# Patient Record
Sex: FEMALE | Race: WHITE | Employment: FULL TIME | URBAN - NONMETROPOLITAN AREA
[De-identification: names, ages, dates, MRNs, and addresses within clinical notes are randomized per-mention and may not be internally consistent; named-entity substitution may affect disease eponyms.]

---

## 2019-01-11 ENCOUNTER — OFFICE VISIT (OUTPATIENT)
Dept: FAMILY MEDICINE CLINIC | Age: 19
End: 2019-01-11
Payer: COMMERCIAL

## 2019-01-11 ENCOUNTER — HOSPITAL ENCOUNTER (OUTPATIENT)
Dept: LAB | Age: 19
Discharge: HOME | End: 2019-01-11
Payer: COMMERCIAL

## 2019-01-11 VITALS
SYSTOLIC BLOOD PRESSURE: 120 MMHG | WEIGHT: 236.4 LBS | BODY MASS INDEX: 40.36 KG/M2 | HEART RATE: 68 BPM | DIASTOLIC BLOOD PRESSURE: 82 MMHG | HEIGHT: 64 IN | OXYGEN SATURATION: 100 %

## 2019-01-11 DIAGNOSIS — Z76.89 ENCOUNTER TO ESTABLISH CARE: ICD-10-CM

## 2019-01-11 DIAGNOSIS — Z00.00 ROUTINE HEALTH MAINTENANCE: ICD-10-CM

## 2019-01-11 DIAGNOSIS — Z00.00 ROUTINE HEALTH MAINTENANCE: Primary | ICD-10-CM

## 2019-01-11 DIAGNOSIS — E87.6 LOW BLOOD POTASSIUM: ICD-10-CM

## 2019-01-11 LAB
ABSOLUTE EOS #: 0 K/UL (ref 0–0.4)
ABSOLUTE IMMATURE GRANULOCYTE: ABNORMAL K/UL (ref 0–0.3)
ABSOLUTE LYMPH #: 2.2 K/UL (ref 1.2–5.2)
ABSOLUTE MONO #: 0.9 K/UL (ref 0.1–1.3)
ALBUMIN SERPL-MCNC: 3.9 G/DL (ref 3.5–5.2)
ALBUMIN/GLOBULIN RATIO: 0.9 (ref 1–2.5)
ALP BLD-CCNC: 81 U/L (ref 35–104)
ALT SERPL-CCNC: 13 U/L (ref 5–33)
ANION GAP SERPL CALCULATED.3IONS-SCNC: 11 MMOL/L (ref 9–17)
AST SERPL-CCNC: 14 U/L
BASOPHILS # BLD: 0 % (ref 0–1)
BASOPHILS ABSOLUTE: 0 K/UL (ref 0–0.2)
BILIRUB SERPL-MCNC: 0.17 MG/DL (ref 0.3–1.2)
BUN BLDV-MCNC: 15 MG/DL (ref 6–20)
BUN/CREAT BLD: 26 (ref 9–20)
CALCIUM SERPL-MCNC: 9.4 MG/DL (ref 8.6–10.4)
CHLORIDE BLD-SCNC: 99 MMOL/L (ref 98–107)
CHOLESTEROL/HDL RATIO: 2.8
CHOLESTEROL: 122 MG/DL
CO2: 29 MMOL/L (ref 20–31)
CREAT SERPL-MCNC: 0.57 MG/DL (ref 0.5–0.9)
DIFFERENTIAL TYPE: ABNORMAL
EOSINOPHILS RELATIVE PERCENT: 0 % (ref 1–7)
ESTIMATED AVERAGE GLUCOSE: 94 MG/DL
GFR AFRICAN AMERICAN: >60 ML/MIN
GFR NON-AFRICAN AMERICAN: >60 ML/MIN
GFR SERPL CREATININE-BSD FRML MDRD: ABNORMAL ML/MIN/{1.73_M2}
GFR SERPL CREATININE-BSD FRML MDRD: ABNORMAL ML/MIN/{1.73_M2}
GLUCOSE BLD-MCNC: 81 MG/DL (ref 70–99)
HBA1C MFR BLD: 4.9 % (ref 4.8–5.9)
HCT VFR BLD CALC: 35.7 % (ref 36–46)
HDLC SERPL-MCNC: 44 MG/DL
HEMOGLOBIN: 11.5 G/DL (ref 12–16)
IMMATURE GRANULOCYTES: ABNORMAL %
LDL CHOLESTEROL: 60 MG/DL (ref 0–130)
LYMPHOCYTES # BLD: 24 % (ref 16–46)
MCH RBC QN AUTO: 26 PG (ref 26–34)
MCHC RBC AUTO-ENTMCNC: 32.3 G/DL (ref 31–37)
MCV RBC AUTO: 80.5 FL (ref 80–100)
MONOCYTES # BLD: 10 % (ref 4–11)
NRBC AUTOMATED: ABNORMAL PER 100 WBC
PDW BLD-RTO: 14.7 % (ref 11–14.5)
PLATELET # BLD: 303 K/UL (ref 140–450)
PLATELET ESTIMATE: ABNORMAL
PMV BLD AUTO: 8.9 FL (ref 6–12)
POTASSIUM SERPL-SCNC: 3.5 MMOL/L (ref 3.7–5.3)
RBC # BLD: 4.44 M/UL (ref 4–5.2)
RBC # BLD: ABNORMAL 10*6/UL
SEG NEUTROPHILS: 66 % (ref 43–77)
SEGMENTED NEUTROPHILS ABSOLUTE COUNT: 5.8 K/UL (ref 1.8–8)
SODIUM BLD-SCNC: 139 MMOL/L (ref 135–144)
THYROXINE, FREE: 1.02 NG/DL (ref 0.93–1.7)
TOTAL PROTEIN: 8.2 G/DL (ref 6.4–8.3)
TRIGL SERPL-MCNC: 88 MG/DL
TSH SERPL DL<=0.05 MIU/L-ACNC: 3.63 MIU/L (ref 0.3–5)
VITAMIN D 25-HYDROXY: 17.8 NG/ML (ref 30–100)
VLDLC SERPL CALC-MCNC: NORMAL MG/DL (ref 1–30)
WBC # BLD: 9 K/UL (ref 4.5–13.5)
WBC # BLD: ABNORMAL 10*3/UL

## 2019-01-11 PROCEDURE — 85025 COMPLETE CBC W/AUTO DIFF WBC: CPT

## 2019-01-11 PROCEDURE — 87389 HIV-1 AG W/HIV-1&-2 AB AG IA: CPT

## 2019-01-11 PROCEDURE — 84439 ASSAY OF FREE THYROXINE: CPT

## 2019-01-11 PROCEDURE — 82306 VITAMIN D 25 HYDROXY: CPT

## 2019-01-11 PROCEDURE — G8484 FLU IMMUNIZE NO ADMIN: HCPCS | Performed by: NURSE PRACTITIONER

## 2019-01-11 PROCEDURE — 80053 COMPREHEN METABOLIC PANEL: CPT

## 2019-01-11 PROCEDURE — 83036 HEMOGLOBIN GLYCOSYLATED A1C: CPT

## 2019-01-11 PROCEDURE — 99385 PREV VISIT NEW AGE 18-39: CPT | Performed by: NURSE PRACTITIONER

## 2019-01-11 PROCEDURE — 80061 LIPID PANEL: CPT

## 2019-01-11 PROCEDURE — 84443 ASSAY THYROID STIM HORMONE: CPT

## 2019-01-11 PROCEDURE — 36415 COLL VENOUS BLD VENIPUNCTURE: CPT

## 2019-01-11 ASSESSMENT — PATIENT HEALTH QUESTIONNAIRE - PHQ9
SUM OF ALL RESPONSES TO PHQ QUESTIONS 1-9: 1
SUM OF ALL RESPONSES TO PHQ QUESTIONS 1-9: 1
SUM OF ALL RESPONSES TO PHQ9 QUESTIONS 1 & 2: 1
2. FEELING DOWN, DEPRESSED OR HOPELESS: 1
1. LITTLE INTEREST OR PLEASURE IN DOING THINGS: 0

## 2019-01-11 ASSESSMENT — ENCOUNTER SYMPTOMS
VOMITING: 0
SHORTNESS OF BREATH: 0
RESPIRATORY NEGATIVE: 1
TROUBLE SWALLOWING: 0
CONSTIPATION: 0
COLOR CHANGE: 0
ABDOMINAL PAIN: 0
NAUSEA: 0
DIARRHEA: 0
EYES NEGATIVE: 1
COUGH: 0
ALLERGIC/IMMUNOLOGIC NEGATIVE: 1
CHEST TIGHTNESS: 0
SINUS PRESSURE: 0
GASTROINTESTINAL NEGATIVE: 1

## 2019-01-12 LAB — HIV AG/AB: NONREACTIVE

## 2019-07-22 ENCOUNTER — TELEPHONE (OUTPATIENT)
Dept: INTERNAL MEDICINE | Age: 19
End: 2019-07-22

## 2019-07-22 NOTE — TELEPHONE ENCOUNTER
Patient is scheduled for a new patient appointment, but is established with [facility]. Appointment about 6 months ago, please verify.

## 2019-07-23 ENCOUNTER — HOSPITAL ENCOUNTER (OUTPATIENT)
Dept: LAB | Age: 19
Discharge: HOME | End: 2019-07-23

## 2019-07-23 ENCOUNTER — OFFICE VISIT (OUTPATIENT)
Dept: INTERNAL MEDICINE | Age: 19
End: 2019-07-23

## 2019-07-23 VITALS
BODY MASS INDEX: 40.75 KG/M2 | WEIGHT: 230 LBS | HEIGHT: 63 IN | RESPIRATION RATE: 16 BRPM | SYSTOLIC BLOOD PRESSURE: 110 MMHG | HEART RATE: 64 BPM | DIASTOLIC BLOOD PRESSURE: 84 MMHG

## 2019-07-23 DIAGNOSIS — Z76.89 ENCOUNTER TO ESTABLISH CARE: Primary | ICD-10-CM

## 2019-07-23 DIAGNOSIS — R93.89 ABNORMAL CT SCAN, NECK: ICD-10-CM

## 2019-07-23 DIAGNOSIS — M54.2 NECK PAIN: ICD-10-CM

## 2019-07-23 LAB
ANION GAP SERPL CALCULATED.3IONS-SCNC: 9 MMOL/L (ref 9–17)
BUN BLDV-MCNC: 11 MG/DL (ref 6–20)
BUN/CREAT BLD: 19 (ref 9–20)
CALCIUM SERPL-MCNC: 9.7 MG/DL (ref 8.6–10.4)
CHLORIDE BLD-SCNC: 103 MMOL/L (ref 98–107)
CO2: 31 MMOL/L (ref 20–31)
CREAT SERPL-MCNC: 0.58 MG/DL (ref 0.5–0.9)
GFR AFRICAN AMERICAN: >60 ML/MIN
GFR NON-AFRICAN AMERICAN: >60 ML/MIN
GFR SERPL CREATININE-BSD FRML MDRD: NORMAL ML/MIN/{1.73_M2}
GFR SERPL CREATININE-BSD FRML MDRD: NORMAL ML/MIN/{1.73_M2}
GLUCOSE BLD-MCNC: 96 MG/DL (ref 70–99)
POTASSIUM SERPL-SCNC: 3.9 MMOL/L (ref 3.7–5.3)
SODIUM BLD-SCNC: 143 MMOL/L (ref 135–144)
TSH SERPL DL<=0.05 MIU/L-ACNC: 2.79 MIU/L (ref 0.3–5)

## 2019-07-23 PROCEDURE — 99202 OFFICE O/P NEW SF 15 MIN: CPT | Performed by: NURSE PRACTITIONER

## 2019-07-23 PROCEDURE — 80048 BASIC METABOLIC PNL TOTAL CA: CPT

## 2019-07-23 PROCEDURE — 36415 COLL VENOUS BLD VENIPUNCTURE: CPT

## 2019-07-23 PROCEDURE — 84443 ASSAY THYROID STIM HORMONE: CPT

## 2019-07-23 ASSESSMENT — PATIENT HEALTH QUESTIONNAIRE - PHQ9
SUM OF ALL RESPONSES TO PHQ QUESTIONS 1-9: 1
SUM OF ALL RESPONSES TO PHQ9 QUESTIONS 1 & 2: 1
1. LITTLE INTEREST OR PLEASURE IN DOING THINGS: 0
2. FEELING DOWN, DEPRESSED OR HOPELESS: 1
SUM OF ALL RESPONSES TO PHQ QUESTIONS 1-9: 1

## 2019-07-23 ASSESSMENT — ENCOUNTER SYMPTOMS
COUGH: 0
WHEEZING: 0
NAUSEA: 0
DIARRHEA: 0
VOMITING: 0
RHINORRHEA: 0

## 2019-07-23 NOTE — PROGRESS NOTES
Neurological: Negative for dizziness and weakness. Objective:     Vitals:    07/23/19 1059   BP: 110/84   Site: Left Upper Arm   Position: Sitting   Cuff Size: Large Adult   Pulse: 64   Resp: 16   Weight: 230 lb (104.3 kg)   Height: 5' 3\" (1.6 m)     Physical Exam   Constitutional: She appears well-developed and well-nourished. Cardiovascular: Normal rate and regular rhythm. Pulmonary/Chest: Effort normal and breath sounds normal.   Abdominal: Soft. There is no tenderness. Musculoskeletal: She exhibits no edema. Lymphadenopathy:     She has no cervical adenopathy. Psychiatric: She has a normal mood and affect. Her behavior is normal.   Nursing note and vitals reviewed. Assessment/Plan:        1. Encounter to establish care    2. Abnormal CT scan, neck  - Possible thymic density noted in initial CT of neck was followed up by chest CT, which was unremarkable. Will follow up with thyroid labs and thyroid ultrasound for the abnormalities noted in CT of neck. - Basic Metabolic Panel; Future  - TSH With Reflex Ft4; Future  - US THYROID; Future    3. Neck pain  - Will refer to pain management for further evaluation.  - Shahrzad Pickard MD, Pain Management, [place]        Return in about 1 year (around 7/23/2020). Orders Placed This Encounter   Procedures    US THYROID     Standing Status:   Future     Standing Expiration Date:   7/23/2020     Order Specific Question:   Reason for exam:     Answer:   CT cervical spine noted prominent tissue posterior to the right thyroid lob, possibly enlarged parathyroid or prominent thyroid.     Basic Metabolic Panel     Standing Status:   Future     Standing Expiration Date:   7/23/2020    TSH With Reflex Ft4     Standing Status:   Future     Standing Expiration Date:   7/23/2020    Shahrzad Pickard MD, Pain Management, [place]     Referral Priority:   Routine     Referral Type:   Eval and Treat     Referral Reason:   Specialty Services

## 2019-07-24 ENCOUNTER — HOSPITAL ENCOUNTER (OUTPATIENT)
Dept: INTERVENTIONAL RADIOLOGY/VASCULAR | Age: 19
Discharge: HOME | End: 2019-07-26

## 2019-07-24 DIAGNOSIS — R93.89 ABNORMAL CT SCAN, NECK: ICD-10-CM

## 2019-07-24 PROCEDURE — 76536 US EXAM OF HEAD AND NECK: CPT

## 2020-02-15 ENCOUNTER — HOSPITAL ENCOUNTER (EMERGENCY)
Age: 20
Discharge: HOME | End: 2020-02-15
Attending: EMERGENCY MEDICINE
Payer: COMMERCIAL

## 2020-02-15 ENCOUNTER — APPOINTMENT (OUTPATIENT)
Dept: GENERAL RADIOLOGY | Age: 20
End: 2020-02-15
Payer: COMMERCIAL

## 2020-02-15 VITALS
HEIGHT: 64 IN | OXYGEN SATURATION: 100 % | RESPIRATION RATE: 16 BRPM | SYSTOLIC BLOOD PRESSURE: 112 MMHG | HEART RATE: 107 BPM | DIASTOLIC BLOOD PRESSURE: 53 MMHG | TEMPERATURE: 97.8 F | WEIGHT: 200 LBS | BODY MASS INDEX: 34.15 KG/M2

## 2020-02-15 LAB
DIRECT EXAM: NORMAL
DIRECT EXAM: NORMAL
Lab: NORMAL
Lab: NORMAL
POC STREP A ANTIGEN: NEGATIVE
SPECIMEN DESCRIPTION: NORMAL
SPECIMEN DESCRIPTION: NORMAL

## 2020-02-15 PROCEDURE — 87651 STREP A DNA AMP PROBE: CPT

## 2020-02-15 PROCEDURE — 87804 INFLUENZA ASSAY W/OPTIC: CPT

## 2020-02-15 PROCEDURE — 99284 EMERGENCY DEPT VISIT MOD MDM: CPT

## 2020-02-15 PROCEDURE — 71046 X-RAY EXAM CHEST 2 VIEWS: CPT

## 2020-02-15 PROCEDURE — 6370000000 HC RX 637 (ALT 250 FOR IP): Performed by: EMERGENCY MEDICINE

## 2020-02-15 RX ORDER — DIPHENHYDRAMINE HCL 25 MG
25 TABLET ORAL ONCE
Status: COMPLETED | OUTPATIENT
Start: 2020-02-15 | End: 2020-02-15

## 2020-02-15 RX ORDER — AZITHROMYCIN 250 MG/1
TABLET, FILM COATED ORAL
Qty: 1 PACKET | Refills: 0 | Status: SHIPPED | OUTPATIENT
Start: 2020-02-15 | End: 2020-02-19

## 2020-02-15 RX ORDER — IBUPROFEN 800 MG/1
800 TABLET ORAL ONCE
Status: COMPLETED | OUTPATIENT
Start: 2020-02-15 | End: 2020-02-15

## 2020-02-15 RX ADMIN — IBUPROFEN 800 MG: 800 TABLET, FILM COATED ORAL at 03:21

## 2020-02-15 RX ADMIN — DIPHENHYDRAMINE HCL 25 MG: 25 TABLET ORAL at 03:21

## 2020-02-15 ASSESSMENT — ENCOUNTER SYMPTOMS
RHINORRHEA: 1
ABDOMINAL PAIN: 0
SORE THROAT: 1
NAUSEA: 0
COUGH: 1
VOMITING: 0
CHEST TIGHTNESS: 1
CONSTIPATION: 0
DIARRHEA: 0
SHORTNESS OF BREATH: 0
COLOR CHANGE: 0
EYE DISCHARGE: 0
EYE REDNESS: 0
STRIDOR: 0
EYE PAIN: 0
WHEEZING: 0

## 2020-02-15 ASSESSMENT — PAIN DESCRIPTION - PAIN TYPE: TYPE: ACUTE PAIN

## 2020-02-15 ASSESSMENT — PAIN SCALES - GENERAL
PAINLEVEL_OUTOF10: 4
PAINLEVEL_OUTOF10: 4

## 2020-02-15 ASSESSMENT — PAIN DESCRIPTION - LOCATION: LOCATION: THROAT

## 2020-02-15 NOTE — ED PROVIDER NOTES
[facility] ED  [address]  [place] [address]  Phone: [phone]        Pt Name: Sherin Gonzalez  MRN: [id number]  [place] 2000  Date of evaluation: 2/15/20      CHIEF COMPLAINT     Chief Complaint   Patient presents with    Pharyngitis     pt states she feels like a person is sitting on her chest, and h/a \"i can't shake\" ibuprofen at 3pm.         HISTORY OF PRESENT ILLNESS  (Location/Symptom, Timing/Onset, Context/Setting, Quality, Duration, Modifying Factors, Severity.)    Sherin Gonzalez is a 22 y.o. female who presents complaining of sore throat. She relates she has a headache that she can't seem to shake she relates. She also feels like someone is sitting on her chest and it is heavy. She has had a cough. She relates the last thing she had bronchitis she felt like this. Family relates she just does not seem to be herself and is acting a little bit differently as well. She is generally healthy and well. She did take Motrin at 3 PM.  But it did not help much. REVIEW OF SYSTEMS    (2-9 systems for level 4, 10 or more for level 5)     Review of Systems   Constitutional: Positive for chills. Negative for activity change, appetite change, fatigue and fever. HENT: Positive for congestion, rhinorrhea and sore throat. Negative for ear pain. Eyes: Negative for pain, discharge and redness. Respiratory: Positive for cough and chest tightness. Negative for shortness of breath, wheezing and stridor. Cardiovascular: Positive for chest pain. Gastrointestinal: Negative for abdominal pain, constipation, diarrhea, nausea and vomiting. Genitourinary: Negative for decreased urine volume and difficulty urinating. Musculoskeletal: Negative for arthralgias and myalgias. Skin: Negative for color change and rash. Neurological: Positive for headaches. Negative for dizziness and weakness. Psychiatric/Behavioral: Negative for behavioral problems and confusion.        PAST MEDICAL HISTORY    has no past medical history on file. SURGICAL HISTORY      has a past surgical history that includes [place] tooth extraction (Bilateral). CURRENTMEDICATIONS       Previous Medications    No medications on file       ALLERGIES     has No Known Allergies. FAMILY HISTORY     She indicated that her mother is alive. She indicated that her father is alive. She indicated that her brother is alive. She indicated that her maternal grandmother is . She indicated that her maternal grandfather is . She indicated that her paternal grandmother is . She indicated that her paternal grandfather is . family history includes Cancer in her maternal grandfather; Heart Disease in her father, maternal grandfather, maternal grandmother, mother, paternal grandfather, and paternal grandmother. SOCIAL HISTORY      reports that she has never smoked. She has never used smokeless tobacco. She reports that she does not drink alcohol or use drugs. PHYSICAL EXAM    (up to 7 for level 4, 8 or more for level 5)   INITIAL VITALS:  height is 5' 4\" (1.626 m) and weight is 200 lb (90.7 kg). Her tympanic temperature is 97.8 °F (36.6 °C). Her blood pressure is 116/66 and her pulse is 99. Her respiration is 15 and oxygen saturation is 100%. Physical Exam  Constitutional:       General: She is not in acute distress. Appearance: She is well-developed. She is not ill-appearing, toxic-appearing or diaphoretic. HENT:      Head: Normocephalic and atraumatic. Right Ear: External ear normal. A middle ear effusion is present. Tympanic membrane is not injected, erythematous, retracted or bulging. Left Ear: External ear normal. A middle ear effusion is present. Tympanic membrane is not injected, erythematous, retracted or bulging. Nose: Congestion and rhinorrhea present. Mouth/Throat:      Pharynx: Pharyngeal swelling and posterior oropharyngeal erythema present.  No visualized and the radiologist interpretations are reviewed as follows:     Interpretation per the Radiologist below, if available at the time of this note:    Xr Chest Standard (2 Vw)    Result Date: 2/15/2020  EXAMINATION: TWO XRAY VIEWS OF THE CHEST 2/15/2020 3:34 am COMPARISON: 04/16/2012 HISTORY: ORDERING SYSTEM PROVIDED HISTORY: cough TECHNOLOGIST PROVIDED HISTORY: cough Reason for Exam: Chest pain, patient states it feel like someone is sitting on her chest Acuity: Acute Type of Exam: Initial FINDINGS: The lungs are clear. There is no pleural effusion. The cardiomediastinal silhouette is normal. There is no pneumothorax. No acute disease. LABS:  Results for orders placed or performed during the hospital encounter of 02/15/20   Flu A/B Ag Detection   Result Value Ref Range    Specimen Description . NASOPHARYNGEAL SWAB     Special Requests NOT REPORTED     Direct Exam       NEGATIVE FOR INFLUENZA A AND B ANTIGEN. * A negative result does not exclude Influenza infection. Negative results should be confirmed by PCR testing. POC Strep A Antigen   Result Value Ref Range    POC Strep A Antigen NEGATIVE NEGATIVE   Lab work has been reviewed. EMERGENCY DEPARTMENT COURSE:   Vitals:    Vitals:    02/15/20 0258   BP: 116/66   Pulse: 99   Resp: 15   Temp: 97.8 °F (36.6 °C)   TempSrc: Tympanic   SpO2: 100%   Weight: 200 lb (90.7 kg)   Height: 5' 4\" (1.626 m)     -------------------------  BP: 116/66, Temp: 97.8 °F (36.6 °C), Pulse: 99, Resp: 15      RE-EVALUATION:  Patient is feeling somewhat better on reevaluation. We did go over workup done here and the need for follow-up. They know what to return to the emergency department for as well. CONSULTS:  None    PROCEDURES:  None    FINAL IMPRESSION      1.  Bronchitis          DISPOSITION/PLAN   DISPOSITION  home      CONDITION ON DISPOSITION:   stable    PATIENT REFERRED TO:  JORGE Ochoa - [facility]  [id number] [facility]

## 2020-02-15 NOTE — ED TRIAGE NOTES
Pt presents d/t complaints of sore throat that started yesterday, \"a headache I can't shake\" and feeling like a very large person is sitting on her chest. Pt states she took motrin yesterday with no relief and did not try any other interventions after that.

## 2021-11-02 VITALS
WEIGHT: 262 LBS | DIASTOLIC BLOOD PRESSURE: 97 MMHG | BODY MASS INDEX: 44.73 KG/M2 | HEART RATE: 78 BPM | TEMPERATURE: 97.9 F | RESPIRATION RATE: 18 BRPM | OXYGEN SATURATION: 98 % | HEIGHT: 64 IN | SYSTOLIC BLOOD PRESSURE: 146 MMHG

## 2021-11-02 PROCEDURE — 99283 EMERGENCY DEPT VISIT LOW MDM: CPT

## 2021-11-03 ENCOUNTER — HOSPITAL ENCOUNTER (EMERGENCY)
Age: 21
Discharge: HOME | End: 2021-11-03
Attending: EMERGENCY MEDICINE

## 2021-11-03 DIAGNOSIS — R11.2 NAUSEA AND VOMITING, INTRACTABILITY OF VOMITING NOT SPECIFIED, UNSPECIFIED VOMITING TYPE: Primary | ICD-10-CM

## 2021-11-03 LAB
-: ABNORMAL
AMORPHOUS: ABNORMAL
BACTERIA: ABNORMAL
BILIRUBIN URINE: NEGATIVE
CASTS UA: ABNORMAL /LPF (ref 0–8)
COLOR: YELLOW
CRYSTALS, UA: ABNORMAL /HPF
EPITHELIAL CELLS UA: ABNORMAL /HPF (ref 0–5)
GLUCOSE URINE: NEGATIVE
HCG(URINE) PREGNANCY TEST: NEGATIVE
KETONES, URINE: ABNORMAL
LEUKOCYTE ESTERASE, URINE: NEGATIVE
MUCUS: ABNORMAL
NITRITE, URINE: NEGATIVE
OTHER OBSERVATIONS UA: ABNORMAL
PH UA: 5.5 (ref 5–8)
PROTEIN UA: NEGATIVE
RBC UA: ABNORMAL /HPF (ref 0–4)
RENAL EPITHELIAL, UA: ABNORMAL /HPF
SPECIFIC GRAVITY UA: 1.03 (ref 1–1.03)
TRICHOMONAS: ABNORMAL
TURBIDITY: CLEAR
URINE HGB: ABNORMAL
UROBILINOGEN, URINE: NORMAL
WBC UA: ABNORMAL /HPF (ref 0–5)
YEAST: ABNORMAL

## 2021-11-03 PROCEDURE — 81001 URINALYSIS AUTO W/SCOPE: CPT

## 2021-11-03 PROCEDURE — 6370000000 HC RX 637 (ALT 250 FOR IP): Performed by: STUDENT IN AN ORGANIZED HEALTH CARE EDUCATION/TRAINING PROGRAM

## 2021-11-03 PROCEDURE — 87086 URINE CULTURE/COLONY COUNT: CPT

## 2021-11-03 PROCEDURE — 81025 URINE PREGNANCY TEST: CPT

## 2021-11-03 PROCEDURE — 86403 PARTICLE AGGLUT ANTBDY SCRN: CPT

## 2021-11-03 RX ORDER — ONDANSETRON 4 MG/1
4 TABLET, ORALLY DISINTEGRATING ORAL ONCE
Status: COMPLETED | OUTPATIENT
Start: 2021-11-03 | End: 2021-11-03

## 2021-11-03 RX ORDER — ONDANSETRON 4 MG/1
4 TABLET, ORALLY DISINTEGRATING ORAL EVERY 8 HOURS PRN
Qty: 10 TABLET | Refills: 0 | Status: SHIPPED | OUTPATIENT
Start: 2021-11-03 | End: 2021-11-29 | Stop reason: ALTCHOICE

## 2021-11-03 RX ADMIN — ONDANSETRON 4 MG: 4 TABLET, ORALLY DISINTEGRATING ORAL at 00:33

## 2021-11-03 ASSESSMENT — ENCOUNTER SYMPTOMS
BLOOD IN STOOL: 0
RHINORRHEA: 0
CONSTIPATION: 0
VOMITING: 1
ABDOMINAL PAIN: 0
SHORTNESS OF BREATH: 0
DIARRHEA: 0
SORE THROAT: 0
NAUSEA: 1

## 2021-11-03 NOTE — ED NOTES
The following labs labeled with pt sticker and tubed to lab:     [] Blue     [] Lavender   [] on ice  [] Green/yellow  [] Green/black [] on ice  [] Yellow  [] Red  [] Pink      [] COVID-19 swab    [] Rapid  [] PCR  [] Peds Viral Panel     [x] Urine Sample  [] Pelvic Cultures  [] Blood Cultures            Elayne Beach, RN  11/03/21 0036

## 2021-11-03 NOTE — ED PROVIDER NOTES
[facility] ED  Emergency Department Encounter  EmergencyMedicine Resident     Pt Ashley Magaña  MRN: [id number]  [place] 2000  Date of evaluation: 11/3/21  PCP:  No primary care provider on file. This patient was evaluated in the Emergency Department for symptoms described in the history of present illness. The patient was evaluated in the context of the global COVID-19 pandemic, which necessitated consideration that the patient might be at risk for infection with the SARS-CoV-2 virus that causes COVID-19. Institutional protocols and algorithms that pertain to the evaluation of patients at risk for COVID-19 are in a state of rapid change based on information released by regulatory bodies including the CDC and federal and state organizations. These policies and algorithms were followed during the patient's care in the ED. CHIEF COMPLAINT       Chief Complaint   Patient presents with    Emesis       HISTORY OF PRESENT ILLNESS  (Location/Symptom, Timing/Onset, Context/Setting, Quality, Duration, Modifying Factors, Severity.)      Hubert Guerra is a 19 y.o. female who presents with nausea and vomiting. Patient presents with 1 day of nausea and vomiting, denies any other symptoms. Patient denies any other symptoms. Patient denies fevers, chills, headaches, visual changes, cough, congestion, chest pain, shortness of breath, abdominal pain, dysuria, frequency, urgency, diarrhea, vaginal discharge, vaginal bleeding, lower extremity swelling or pain. Patient's last menstrual period was in July, patient usually has normal periods, patient has multiple negative home pregnancy tests. Patient has not followed with OB/GYN yet. PAST MEDICAL / SURGICAL / SOCIAL / FAMILY HISTORY      has no past medical history on file. has a past surgical history that includes [place] tooth extraction (Bilateral).       Social History     Socioeconomic History    Marital status: Single Spouse name: Not on file    Number of children: Not on file    Years of education: Not on file    Highest education level: Not on file   Occupational History    Not on file   Tobacco Use    Smoking status: Never Smoker    Smokeless tobacco: Never Used   Substance and Sexual Activity    Alcohol use: No    Drug use: No    Sexual activity: Not on file   Other Topics Concern    Not on file   Social History Narrative    Not on file     Social Determinants of Health     Financial Resource Strain:     Difficulty of Paying Living Expenses:    Food Insecurity:     Worried About Running Out of Food in the Last Year:     [address] in the Last Year:    Transportation Needs:     Lack of Transportation (Medical):  Lack of Transportation (Non-Medical):    Physical Activity:     Days of Exercise per Week:     Minutes of Exercise per Session:    Stress:     Feeling of Stress :    Social Connections:     Frequency of Communication with Friends and Family:     Frequency of Social Gatherings with Friends and Family:     Attends Yazidism Services:     Active Member of Clubs or Organizations:     Attends Club or Organization Meetings:     Marital Status:    Intimate Partner Violence:     Fear of Current or Ex-Partner:     Emotionally Abused:     Physically Abused:     Sexually Abused:        Family History   Problem Relation Age of Onset    Heart Disease Mother     Heart Disease Father     Heart Disease Maternal Grandmother     Heart Disease Maternal Grandfather     Cancer Maternal Grandfather         lung CA    Heart Disease Paternal Grandmother     Heart Disease Paternal Grandfather        Allergies:  Patient has no known allergies. Home Medications:  Prior to Admission medications    Medication Sig Start Date End Date Taking?  Authorizing Provider   ondansetron (ZOFRAN ODT) 4 MG disintegrating tablet Take 1 tablet by mouth every 8 hours as needed for Nausea 11/3/21  Yes Misael Chua MD REVIEW OF SYSTEMS    (2-9 systems for level 4, 10 or more for level 5)      Review of Systems   Constitutional: Negative for chills, diaphoresis, fatigue and fever. HENT: Negative for congestion, rhinorrhea and sore throat. Eyes: Negative for visual disturbance. Respiratory: Negative for shortness of breath. Cardiovascular: Negative for chest pain. Gastrointestinal: Positive for nausea and vomiting. Negative for abdominal pain, blood in stool, constipation and diarrhea. Genitourinary: Negative for dysuria, frequency, hematuria, urgency, vaginal bleeding and vaginal discharge. Musculoskeletal: Negative for neck pain and neck stiffness. Skin: Negative for rash. Neurological: Negative for dizziness, syncope, weakness, light-headedness and headaches. PHYSICAL EXAM   (up to 7 for level 4, 8 or more for level 5)      INITIAL VITALS:   BP (!) 146/97   Pulse 78   Temp 97.9 °F (36.6 °C) (Infrared)   Resp 18   Ht 5' 4\" (1.626 m)   Wt 262 lb (118.8 kg)   LMP 07/23/2021   SpO2 98%   BMI 44.97 kg/m²     Physical Exam  Constitutional:       General: She is not in acute distress. Appearance: Normal appearance. She is well-developed. She is not ill-appearing, toxic-appearing or diaphoretic. Comments: Patient is alert oriented, openly communicative, not actively vomiting on exam   HENT:      Head: Normocephalic and atraumatic. Right Ear: External ear normal.      Left Ear: External ear normal.      Nose: Nose normal. No congestion or rhinorrhea. Eyes:      General:         Right eye: No discharge. Left eye: No discharge. Extraocular Movements: Extraocular movements intact. Pupils: Pupils are equal, round, and reactive to light. Neck:      Vascular: No JVD. Trachea: No tracheal deviation. Cardiovascular:      Rate and Rhythm: Normal rate and regular rhythm. Pulses: Normal pulses. Heart sounds: Normal heart sounds. No murmur heard.    No friction rub. No gallop. Pulmonary:      Effort: Pulmonary effort is normal. No respiratory distress. Breath sounds: Normal breath sounds. No stridor. No wheezing, rhonchi or rales. Chest:      Chest wall: No tenderness. Abdominal:      General: There is no distension. Palpations: Abdomen is soft. There is no mass. Tenderness: There is no abdominal tenderness. There is no right CVA tenderness, left CVA tenderness or guarding. Musculoskeletal:         General: No tenderness. Normal range of motion. Cervical back: Normal range of motion and neck supple. No rigidity or tenderness. Right lower leg: No edema. Left lower leg: No edema. Skin:     General: Skin is warm. Capillary Refill: Capillary refill takes less than 2 seconds. Neurological:      Mental Status: She is alert and oriented to person, place, and time.    Psychiatric:         Behavior: Behavior normal.         DIFFERENTIAL  DIAGNOSIS     PLAN (LABS / IMAGING / EKG):  Orders Placed This Encounter   Procedures    Culture, Urine    Urinalysis with Microscopic    Pregnancy, Urine       MEDICATIONS ORDERED:  Orders Placed This Encounter   Medications    ondansetron (ZOFRAN-ODT) disintegrating tablet 4 mg    ondansetron (ZOFRAN ODT) 4 MG disintegrating tablet     Sig: Take 1 tablet by mouth every 8 hours as needed for Nausea     Dispense:  10 tablet     Refill:  0       DDX:     DIAGNOSTIC RESULTS / EMERGENCY DEPARTMENT COURSE / MDM   LAB RESULTS:  Results for orders placed or performed during the hospital encounter of 11/03/21   Urinalysis with Microscopic   Result Value Ref Range    Color, UA Yellow Yellow    Turbidity UA Clear Clear    Glucose, Ur NEGATIVE NEGATIVE    Bilirubin Urine NEGATIVE NEGATIVE    Ketones, Urine TRACE (A) NEGATIVE    Specific Gravity, UA 1.032 (H) 1.005 - 1.030    Urine Hgb MODERATE (A) NEGATIVE    pH, UA 5.5 5.0 - 8.0    Protein, UA NEGATIVE NEGATIVE    Urobilinogen, Urine Normal Normal Nitrite, Urine NEGATIVE NEGATIVE    Leukocyte Esterase, Urine NEGATIVE NEGATIVE    -          WBC, UA 2 TO 5 0 - 5 /HPF    RBC, UA 5 TO 10 0 - 4 /HPF    Casts UA  0 - 8 /LPF     5 TO 10 HYALINE Reference range defined for non-centrifuged specimen. Crystals, UA NOT REPORTED None /HPF    Epithelial Cells UA 5 TO 10 0 - 5 /HPF    Renal Epithelial, UA NOT REPORTED 0 /HPF    Bacteria, UA FEW (A) None    Mucus, UA NOT REPORTED None    Trichomonas, UA NOT REPORTED None    Amorphous, UA NOT REPORTED None    Other Observations UA NOT REPORTED NOT REQ. Yeast, UA NOT REPORTED None   Pregnancy, Urine   Result Value Ref Range    HCG(Urine) Pregnancy Test NEGATIVE NEGATIVE       IMPRESSION: 69-year-old female with 1 day of nausea and vomiting, last menstrual period in July, concern for pregnancy, will obtain urine pregnancy. Will obtain urinalysis to evaluate for UTI. Will treat symptoms with Zofran, reassess. Patient has no systemic complaints, slight hypertension, but otherwise normal vital signs, no abdominal pain, no indication for labs or imaging. RADIOLOGY:      EKG      All EKG's are interpreted by the Emergency Department Physician who either signs or Co-signs this chart in the absence of a cardiologist.    EMERGENCY DEPARTMENT COURSE:  Patient came to emergency department, HPI and physical exam were conducted. All nursing notes were reviewed. Patient is able to tolerate oral intake without vomiting. Patient is not pregnant, urinalysis not consistent with UTI. Patient remained stable in the emergency department with normal vital signs. Gave strict return precautions to the emergency department and discharge patient home. Recommended patient follow-up with OB/GYN for further management. PROCEDURES:      CONSULTS:  None    CRITICAL CARE:      FINAL IMPRESSION      1.  Nausea and vomiting, intractability of vomiting not specified, unspecified vomiting type          DISPOSITION / PLAN     DISPOSITION Decision To Discharge 11/03/2021 01:35:43 AM      PATIENT REFERRED TO:  [facility] ED  [address] [facility]  [phone]  Go to   As needed, If symptoms worsen    [address] [facility] [address]  [phone]  Schedule an appointment as soon as possible for a visit in 3 days  For reassessment      DISCHARGE MEDICATIONS:  Discharge Medication List as of 11/3/2021  2:00 AM      START taking these medications    Details   ondansetron (ZOFRAN ODT) 4 MG disintegrating tablet Take 1 tablet by mouth every 8 hours as needed for Nausea, Disp-10 tablet, R-0Print             Ike Lee MD  Emergency Medicine Resident    (Please note that portions of thisnote were completed with a voice recognition program.  Efforts were made to edit the dictations but occasionally words are mis-transcribed.)        Ike Lee MD  Resident  11/03/21 0085

## 2021-11-03 NOTE — ED PROVIDER NOTES
[facility] ED     Emergency Department     Faculty Attestation    I performed a history and physical examination of the patient and discussed management with the resident. I reviewed the residents note and agree with the documented findings and plan of care. Any areas of disagreement are noted on the chart. I was personally present for the key portions of any procedures. I have documented in the chart those procedures where I was not present during the key portions. I have reviewed the emergency nurses triage note. I agree with the chief complaint, past medical history, past surgical history, allergies, medications, social and family history as documented unless otherwise noted below. For Physician Assistant/ Nurse Practitioner cases/documentation I have personally evaluated this patient and have completed at least one if not all key elements of the E/M (history, physical exam, and MDM). Additional findings are as noted. This patient was evaluated in the Emergency Department for symptoms described in the history of present illness. He/she was evaluated in the context of the global COVID-19 pandemic, which necessitated consideration that the patient might be at risk for infection with the SARS-CoV-2 virus that causes COVID-19. Institutional protocols and algorithms that pertain to the evaluation of patients at risk for COVID-19 are in a state of rapid change based on information released by regulatory bodies including the CDC and federal and state organizations. These policies and algorithms were followed during the patient's care in the ED. Patient without vomiting no abdominal pain no vaginal bleeding no spotting. Has had a regular menses since July does not normally have a regular menses. States possibly pregnant but has taken multiple home tests and they were all negative. On exam nontoxic well-appearing. Abdomen soft nontender.   Will check urine, pregnancy test, reevaluate need for additional work-up      Critical Care     none    Sunday Larsen MD, Jessica Khan  Attending Emergency  Physician             Sunday Larsen MD  11/03/21 0031

## 2021-11-03 NOTE — Clinical Note
Maximus Gonzáles was seen and treated in our emergency department on 11/2/2021. She may return to work on 11/03/2021. If you have any questions or concerns, please don't hesitate to call.       Roberto Carlos Matthews MD

## 2021-11-04 LAB
CULTURE: NORMAL
Lab: NORMAL
SPECIMEN DESCRIPTION: NORMAL

## 2021-11-24 ENCOUNTER — HOSPITAL ENCOUNTER (EMERGENCY)
Age: 21
Discharge: LEFT BEFORE DISCHARGE | End: 2021-11-24

## 2021-11-24 VITALS
WEIGHT: 255 LBS | HEIGHT: 64 IN | HEART RATE: 133 BPM | RESPIRATION RATE: 23 BRPM | SYSTOLIC BLOOD PRESSURE: 123 MMHG | DIASTOLIC BLOOD PRESSURE: 83 MMHG | TEMPERATURE: 100.2 F | BODY MASS INDEX: 43.54 KG/M2 | OXYGEN SATURATION: 97 %

## 2021-11-29 ENCOUNTER — HOSPITAL ENCOUNTER (EMERGENCY)
Age: 21
Discharge: HOME | End: 2021-11-29
Attending: SPECIALIST
Payer: COMMERCIAL

## 2021-11-29 ENCOUNTER — APPOINTMENT (OUTPATIENT)
Dept: CT IMAGING | Age: 21
End: 2021-11-29
Payer: COMMERCIAL

## 2021-11-29 VITALS
WEIGHT: 260 LBS | RESPIRATION RATE: 20 BRPM | BODY MASS INDEX: 44.39 KG/M2 | OXYGEN SATURATION: 97 % | HEIGHT: 64 IN | TEMPERATURE: 98.6 F | DIASTOLIC BLOOD PRESSURE: 94 MMHG | SYSTOLIC BLOOD PRESSURE: 148 MMHG | HEART RATE: 71 BPM

## 2021-11-29 DIAGNOSIS — R10.9 ABDOMINAL PAIN, UNSPECIFIED ABDOMINAL LOCATION: Primary | ICD-10-CM

## 2021-11-29 LAB
-: ABNORMAL
ABSOLUTE EOS #: 0.1 K/UL (ref 0–0.4)
ABSOLUTE IMMATURE GRANULOCYTE: NORMAL K/UL (ref 0–0.3)
ABSOLUTE LYMPH #: 2.4 K/UL (ref 1–4.8)
ABSOLUTE MONO #: 0.8 K/UL (ref 0.1–1.2)
ALBUMIN SERPL-MCNC: 3.7 G/DL (ref 3.5–5.2)
ALBUMIN/GLOBULIN RATIO: 0.9 (ref 1–2.5)
ALP BLD-CCNC: 94 U/L (ref 35–104)
ALT SERPL-CCNC: 20 U/L (ref 5–33)
AMORPHOUS: ABNORMAL
ANION GAP SERPL CALCULATED.3IONS-SCNC: 9 MMOL/L (ref 9–17)
AST SERPL-CCNC: 18 U/L
BACTERIA: ABNORMAL
BASOPHILS # BLD: 1 % (ref 0–2)
BASOPHILS ABSOLUTE: 0 K/UL (ref 0–0.2)
BILIRUB SERPL-MCNC: <0.1 MG/DL (ref 0.3–1.2)
BILIRUBIN DIRECT: <0.08 MG/DL
BILIRUBIN URINE: NEGATIVE
BILIRUBIN, INDIRECT: ABNORMAL MG/DL (ref 0–1)
BUN BLDV-MCNC: 11 MG/DL (ref 6–20)
BUN/CREAT BLD: ABNORMAL (ref 9–20)
CALCIUM SERPL-MCNC: 9 MG/DL (ref 8.6–10.4)
CASTS UA: ABNORMAL /LPF
CHLORIDE BLD-SCNC: 101 MMOL/L (ref 98–107)
CO2: 30 MMOL/L (ref 20–31)
COLOR: YELLOW
COMMENT UA: ABNORMAL
CREAT SERPL-MCNC: 0.63 MG/DL (ref 0.5–0.9)
CRYSTALS, UA: ABNORMAL /HPF
DIFFERENTIAL TYPE: NORMAL
EOSINOPHILS RELATIVE PERCENT: 1 % (ref 1–4)
EPITHELIAL CELLS UA: ABNORMAL /HPF (ref 0–5)
GFR AFRICAN AMERICAN: >60 ML/MIN
GFR NON-AFRICAN AMERICAN: >60 ML/MIN
GFR SERPL CREATININE-BSD FRML MDRD: ABNORMAL ML/MIN/{1.73_M2}
GFR SERPL CREATININE-BSD FRML MDRD: ABNORMAL ML/MIN/{1.73_M2}
GLOBULIN: ABNORMAL G/DL (ref 1.5–3.8)
GLUCOSE BLD-MCNC: 101 MG/DL (ref 70–99)
GLUCOSE URINE: NEGATIVE
HCG QUALITATIVE: NEGATIVE
HCT VFR BLD CALC: 36.4 % (ref 36–46)
HEMOGLOBIN: 12.1 G/DL (ref 12–16)
IMMATURE GRANULOCYTES: NORMAL %
KETONES, URINE: NEGATIVE
LEUKOCYTE ESTERASE, URINE: NEGATIVE
LIPASE: 20 U/L (ref 13–60)
LYMPHOCYTES # BLD: 32 % (ref 24–44)
MCH RBC QN AUTO: 26.6 PG (ref 26–34)
MCHC RBC AUTO-ENTMCNC: 33.2 G/DL (ref 31–37)
MCV RBC AUTO: 80.1 FL (ref 80–100)
MONOCYTES # BLD: 10 % (ref 2–11)
MUCUS: ABNORMAL
NITRITE, URINE: NEGATIVE
NRBC AUTOMATED: NORMAL PER 100 WBC
OTHER OBSERVATIONS UA: ABNORMAL
PDW BLD-RTO: 14.2 % (ref 12.5–15.4)
PH UA: 6 (ref 5–8)
PLATELET # BLD: 309 K/UL (ref 140–450)
PLATELET ESTIMATE: NORMAL
PMV BLD AUTO: 8.4 FL (ref 6–12)
POTASSIUM SERPL-SCNC: 3.6 MMOL/L (ref 3.7–5.3)
PROTEIN UA: NEGATIVE
RBC # BLD: 4.54 M/UL (ref 4–5.2)
RBC # BLD: NORMAL 10*6/UL
RBC UA: ABNORMAL /HPF (ref 0–2)
RENAL EPITHELIAL, UA: ABNORMAL /HPF
SEG NEUTROPHILS: 56 % (ref 36–66)
SEGMENTED NEUTROPHILS ABSOLUTE COUNT: 4.2 K/UL (ref 1.8–7.7)
SODIUM BLD-SCNC: 140 MMOL/L (ref 135–144)
SPECIFIC GRAVITY UA: 1.02 (ref 1–1.03)
TOTAL PROTEIN: 8 G/DL (ref 6.4–8.3)
TRICHOMONAS: ABNORMAL
TURBIDITY: CLEAR
URINE HGB: ABNORMAL
UROBILINOGEN, URINE: NORMAL
WBC # BLD: 7.5 K/UL (ref 3.5–11)
WBC # BLD: NORMAL 10*3/UL
WBC UA: ABNORMAL /HPF (ref 0–5)
YEAST: ABNORMAL

## 2021-11-29 PROCEDURE — 36415 COLL VENOUS BLD VENIPUNCTURE: CPT

## 2021-11-29 PROCEDURE — 96374 THER/PROPH/DIAG INJ IV PUSH: CPT

## 2021-11-29 PROCEDURE — 85025 COMPLETE CBC W/AUTO DIFF WBC: CPT

## 2021-11-29 PROCEDURE — 80048 BASIC METABOLIC PNL TOTAL CA: CPT

## 2021-11-29 PROCEDURE — 84703 CHORIONIC GONADOTROPIN ASSAY: CPT

## 2021-11-29 PROCEDURE — 6360000002 HC RX W HCPCS: Performed by: SPECIALIST

## 2021-11-29 PROCEDURE — 83690 ASSAY OF LIPASE: CPT

## 2021-11-29 PROCEDURE — 81001 URINALYSIS AUTO W/SCOPE: CPT

## 2021-11-29 PROCEDURE — 80076 HEPATIC FUNCTION PANEL: CPT

## 2021-11-29 PROCEDURE — 6360000004 HC RX CONTRAST MEDICATION: Performed by: SPECIALIST

## 2021-11-29 PROCEDURE — 99283 EMERGENCY DEPT VISIT LOW MDM: CPT

## 2021-11-29 PROCEDURE — 74177 CT ABD & PELVIS W/CONTRAST: CPT

## 2021-11-29 PROCEDURE — 2580000003 HC RX 258: Performed by: SPECIALIST

## 2021-11-29 RX ORDER — SODIUM CHLORIDE 0.9 % (FLUSH) 0.9 %
10 SYRINGE (ML) INJECTION PRN
Status: DISCONTINUED | OUTPATIENT
Start: 2021-11-29 | End: 2021-11-29 | Stop reason: HOSPADM

## 2021-11-29 RX ORDER — 0.9 % SODIUM CHLORIDE 0.9 %
80 INTRAVENOUS SOLUTION INTRAVENOUS ONCE
Status: COMPLETED | OUTPATIENT
Start: 2021-11-29 | End: 2021-11-29

## 2021-11-29 RX ORDER — ONDANSETRON 2 MG/ML
4 INJECTION INTRAMUSCULAR; INTRAVENOUS ONCE
Status: COMPLETED | OUTPATIENT
Start: 2021-11-29 | End: 2021-11-29

## 2021-11-29 RX ORDER — 0.9 % SODIUM CHLORIDE 0.9 %
1000 INTRAVENOUS SOLUTION INTRAVENOUS ONCE
Status: COMPLETED | OUTPATIENT
Start: 2021-11-29 | End: 2021-11-29

## 2021-11-29 RX ADMIN — SODIUM CHLORIDE 1000 ML: 9 INJECTION, SOLUTION INTRAVENOUS at 02:44

## 2021-11-29 RX ADMIN — SODIUM CHLORIDE 80 ML: 9 INJECTION, SOLUTION INTRAVENOUS at 03:06

## 2021-11-29 RX ADMIN — SODIUM CHLORIDE, PRESERVATIVE FREE 10 ML: 5 INJECTION INTRAVENOUS at 03:06

## 2021-11-29 RX ADMIN — ONDANSETRON 4 MG: 2 INJECTION INTRAMUSCULAR; INTRAVENOUS at 02:44

## 2021-11-29 RX ADMIN — IOPAMIDOL 75 ML: 755 INJECTION, SOLUTION INTRAVENOUS at 03:05

## 2021-11-29 ASSESSMENT — PAIN DESCRIPTION - LOCATION: LOCATION: ABDOMEN

## 2021-11-29 ASSESSMENT — PAIN SCALES - GENERAL: PAINLEVEL_OUTOF10: 6

## 2021-11-29 NOTE — ED PROVIDER NOTES
Lester Cruz 1778 ENCOUNTER      Pt Name: Donovan Baltazar  MRN: [id number]  [place] 2000  Date of evaluation: 11/29/21      CHIEF COMPLAINT       Chief Complaint   Patient presents with    Abdominal Pain     onset Tuesday, right sided    Emesis     unable to keep food down         [address] is a 19 y.o. female who presents to the emergency department for evaluation of right sided mid abdominal pain associate with nausea and vomiting as well as diarrhea. Abdominal pain started 5 to 6 days ago and was initially in the right upper quadrant radiating towards the epigastric and left upper quadrant followed by right mid abdominal pain at this time. She has been vomiting about 3 times a day for last 4 days and diarrhea about 3-4 times a day for last 5 to 6 days. She denies any hematemesis, melena or hematochezia. No history of fever or chills and patient denies any urinary symptoms. Last menstrual period started 6 days ago and ended 3 days ago. She has not had any abdominal surgeries. She denies any chest pain, shortness of breath, palpitations or diaphoresis. She denies any flank pain. REVIEW OF SYSTEMS       Review of Systems    All systems reviewed and negative unless noted in HPI. The patient denies fever or constitutional symptoms. Denies vision change. Denies any sore throat or rhinorrhea. Denies any neck pain or stiffness. Denies chest pain or shortness of breath. Abdominal pain, nausea,  vomiting or diarrhea. Denies any dysuria. Denies urinary frequency or hematuria. Denies musculoskeletal injury or pain. Denies any weakness, numbness or focal neurologic deficit. Denies any skin rash or edema. No recent psychiatric issues. No easy bruising or bleeding. Denies any polyuria, polydypsia or history of immunocompromise. PAST MEDICAL HISTORY    has no past medical history on file.     SURGICAL HISTORY has a past surgical history that includes [place] tooth extraction (Bilateral). CURRENT MEDICATIONS       Previous Medications    No medications on file       ALLERGIES     has No Known Allergies. FAMILY HISTORY     She indicated that her mother is alive. She indicated that her father is alive. She indicated that her brother is alive. She indicated that her maternal grandmother is . She indicated that her maternal grandfather is . She indicated that her paternal grandmother is . She indicated that her paternal grandfather is . family history includes Cancer in her maternal grandfather; Heart Disease in her father, maternal grandfather, maternal grandmother, mother, paternal grandfather, and paternal grandmother. SOCIAL HISTORY      reports that she has never smoked. She has never used smokeless tobacco. She reports that she does not drink alcohol and does not use drugs. PHYSICAL EXAM     INITIAL VITALS:  height is 5' 4\" (1.626 m) and weight is 117.9 kg (260 lb). Her oral temperature is 98.6 °F (37 °C). Her blood pressure is 148/94 (abnormal) and her pulse is 71. Her respiration is 20 and oxygen saturation is 97%. Physical Exam  Vitals and nursing note reviewed. Constitutional:       Appearance: She is well-developed. HENT:      Head: Normocephalic and atraumatic. Nose: Nose normal.   Eyes:      Extraocular Movements: Extraocular movements intact. Pupils: Pupils are equal, round, and reactive to light. Cardiovascular:      Rate and Rhythm: Normal rate and regular rhythm. Heart sounds: Normal heart sounds. No murmur heard. Pulmonary:      Effort: Pulmonary effort is normal. No respiratory distress. Breath sounds: Normal breath sounds. Abdominal:      General: Bowel sounds are normal. There is no distension or abdominal bruit. Palpations: Abdomen is soft. There is no pulsatile mass. Tenderness:  There is abdominal tenderness in the right upper quadrant. Musculoskeletal:      Cervical back: Normal range of motion and neck supple. Skin:     General: Skin is warm and dry. Neurological:      General: No focal deficit present. Mental Status: She is alert and oriented to person, place, and time. DIFFERENTIAL DIAGNOSIS/ MDM:     Cholecystitis, appendicitis, pancreatitis,  urinary tract infection, renal stone, small bowel obstruction,diverticulitis, gastritis, enteritis, colitis. DIAGNOSTIC RESULTS     EKG: All EKG's are interpreted by the Emergency Department Physician who either signs or Co-signs this chart in the absence of a cardiologist.    None obtained    RADIOLOGY:   Interpretation per the Radiologist below, if available at the time of this note:    CT ABDOMEN PELVIS W IV CONTRAST Additional Contrast? None    Result Date: 11/29/2021  EXAMINATION: CT OF THE ABDOMEN AND PELVIS WITH CONTRAST 11/29/2021 2:41 am TECHNIQUE: CT of the abdomen and pelvis was performed with the administration of intravenous contrast. Multiplanar reformatted images are provided for review. Dose modulation, iterative reconstruction, and/or weight based adjustment of the mA/kV was utilized to reduce the radiation dose to as low as reasonably achievable. COMPARISON: None. HISTORY: ORDERING SYSTEM PROVIDED HISTORY: Right mid and upper abd pain TECHNOLOGIST PROVIDED HISTORY: Right mid and upper abd pain Decision Support Exception - unselect if not a suspected or confirmed emergency medical condition->Emergency Medical Condition (MA) Reason for Exam: Right mid and upper abd pain x 10 days Acuity: Acute Type of Exam: Initial FINDINGS: Lower Chest: Lung bases are clear. No effusion. Organs: Liver, gallbladder, spleen, pancreas, kidneys and adrenals demonstrate no acute abnormality. The spleen measures approximately 14.2 cm in greatest AP dimension. GI/Bowel: Normal appendix. No abnormal dilatation or wall thickening.  Pelvis: Urinary bladder unremarkable given the lack of distention. Uterus and adnexa unremarkable for CT technique. Peritoneum/Retroperitoneum: No free fluid. No retroperitoneal adenopathy. Abdominal aorta is normal in caliber. Bones/Soft Tissues: No acute soft tissue abnormality identified. No aggressive osseous lesions. No acute intra-abdominal pathology identified. Mild splenomegaly.          LABS:  Results for orders placed or performed during the hospital encounter of 11/29/21   CBC Auto Differential   Result Value Ref Range    WBC 7.5 3.5 - 11.0 k/uL    RBC 4.54 4.0 - 5.2 m/uL    Hemoglobin 12.1 12.0 - 16.0 g/dL    Hematocrit 36.4 36 - 46 %    MCV 80.1 80 - 100 fL    MCH 26.6 26 - 34 pg    MCHC 33.2 31 - 37 g/dL    RDW 14.2 12.5 - 15.4 %    Platelets 744 746 - 508 k/uL    MPV 8.4 6.0 - 12.0 fL    NRBC Automated NOT REPORTED per 100 WBC    Differential Type NOT REPORTED     Seg Neutrophils 56 36 - 66 %    Lymphocytes 32 24 - 44 %    Monocytes 10 2 - 11 %    Eosinophils % 1 1 - 4 %    Basophils 1 0 - 2 %    Immature Granulocytes NOT REPORTED 0 %    Segs Absolute 4.20 1.8 - 7.7 k/uL    Absolute Lymph # 2.40 1.0 - 4.8 k/uL    Absolute Mono # 0.80 0.1 - 1.2 k/uL    Absolute Eos # 0.10 0.0 - 0.4 k/uL    Basophils Absolute 0.00 0.0 - 0.2 k/uL    Absolute Immature Granulocyte NOT REPORTED 0.00 - 0.30 k/uL    WBC Morphology NOT REPORTED     RBC Morphology NOT REPORTED     Platelet Estimate NOT REPORTED    Basic Metabolic Panel w/ Reflex to MG   Result Value Ref Range    Glucose 101 (H) 70 - 99 mg/dL    BUN 11 6 - 20 mg/dL    CREATININE 0.63 0.50 - 0.90 mg/dL    Bun/Cre Ratio NOT REPORTED 9 - 20    Calcium 9.0 8.6 - 10.4 mg/dL    Sodium 140 135 - 144 mmol/L    Potassium 3.6 (L) 3.7 - 5.3 mmol/L    Chloride 101 98 - 107 mmol/L    CO2 30 20 - 31 mmol/L    Anion Gap 9 9 - 17 mmol/L    GFR Non-African American >60 >60 mL/min    GFR African American >60 >60 mL/min    GFR Comment          GFR Staging NOT REPORTED    Hepatic Function Panel   Result Value Ref Range    Albumin 3.7 3.5 - 5.2 g/dL    Alkaline Phosphatase 94 35 - 104 U/L    ALT 20 5 - 33 U/L    AST 18 <32 U/L    Total Bilirubin <0.10 (L) 0.3 - 1.2 mg/dL    Bilirubin, Direct <0.08 <0.31 mg/dL    Bilirubin, Indirect Can not be calculated 0.00 - 1.00 mg/dL    Total Protein 8.0 6.4 - 8.3 g/dL    Globulin NOT REPORTED 1.5 - 3.8 g/dL    Albumin/Globulin Ratio 0.9 (L) 1.0 - 2.5   Lipase   Result Value Ref Range    Lipase 20 13 - 60 U/L   HCG Qualitative, Serum   Result Value Ref Range    hCG Qual NEGATIVE NEGATIVE   Urinalysis Reflex to Culture    Specimen: Urine, clean catch   Result Value Ref Range    Color, UA Yellow Yellow    Turbidity UA Clear Clear    Glucose, Ur NEGATIVE NEGATIVE    Bilirubin Urine NEGATIVE NEGATIVE    Ketones, Urine NEGATIVE NEGATIVE    Specific Gravity, UA 1.025 1.005 - 1.030    Urine Hgb MODERATE (A) NEGATIVE    pH, UA 6.0 5.0 - 8.0    Protein, UA NEGATIVE NEGATIVE    Urobilinogen, Urine Normal Normal    Nitrite, Urine NEGATIVE NEGATIVE    Leukocyte Esterase, Urine NEGATIVE NEGATIVE    Urinalysis Comments NOT REPORTED    Microscopic Urinalysis   Result Value Ref Range    -          WBC, UA 5 TO 10 0 - 5 /HPF    RBC, UA 10 TO 20 0 - 2 /HPF    Casts UA NOT REPORTED /LPF    Crystals, UA NOT REPORTED None /HPF    Epithelial Cells UA 5 TO 10 0 - 5 /HPF    Renal Epithelial, UA NOT REPORTED 0 /HPF    Bacteria, UA FEW (A) None    Mucus, UA 3+ (A) None    Trichomonas, UA NOT REPORTED None    Amorphous, UA NOT REPORTED None    Other Observations UA (A) NOT REQ. Utilizing a urinalysis as the only screening method to exclude a potential uropathogen can be unreliable in many patient populations. Rapid screening tests are less sensitive than culture and if UTI is a clinical possibility, culture should be considered despite a negative urinalysis.     Yeast, UA NOT REPORTED None       Patient has microscopic hematuria and mild hypokalemia    EMERGENCY DEPARTMENT COURSE: [facility]  [address] [place] [address]  [phone]    If symptoms worsen      DISCHARGE MEDICATIONS:  New Prescriptions    No medications on file       (Please note that portions of this note were completed with a voice recognition program.  Efforts were made to edit the dictations but occasionally words are mis-transcribed.)    Megan Gonzalez MD,, MD, F.A.C.E.P.   Attending Emergency Medicine Physician      Megan Gonzalez MD  11/29/21 [id number]

## 2021-11-29 NOTE — Clinical Note
Antwon Rodriguez was seen and treated in our emergency department on 11/29/2021. She may return to work on 11/29/2021. If you have any questions or concerns, please don't hesitate to call.       Nan England MD

## 2022-05-12 ENCOUNTER — APPOINTMENT (OUTPATIENT)
Dept: GENERAL RADIOLOGY | Age: 22
End: 2022-05-12
Payer: COMMERCIAL

## 2022-05-12 ENCOUNTER — HOSPITAL ENCOUNTER (EMERGENCY)
Age: 22
Discharge: HOME | End: 2022-05-12
Attending: EMERGENCY MEDICINE
Payer: COMMERCIAL

## 2022-05-12 VITALS
HEART RATE: 88 BPM | DIASTOLIC BLOOD PRESSURE: 115 MMHG | OXYGEN SATURATION: 99 % | TEMPERATURE: 97.9 F | SYSTOLIC BLOOD PRESSURE: 170 MMHG | RESPIRATION RATE: 18 BRPM

## 2022-05-12 DIAGNOSIS — M25.511 ACUTE PAIN OF RIGHT SHOULDER: Primary | ICD-10-CM

## 2022-05-12 PROCEDURE — 6360000002 HC RX W HCPCS: Performed by: STUDENT IN AN ORGANIZED HEALTH CARE EDUCATION/TRAINING PROGRAM

## 2022-05-12 PROCEDURE — 73000 X-RAY EXAM OF COLLAR BONE: CPT

## 2022-05-12 PROCEDURE — 99284 EMERGENCY DEPT VISIT MOD MDM: CPT

## 2022-05-12 PROCEDURE — 73030 X-RAY EXAM OF SHOULDER: CPT

## 2022-05-12 PROCEDURE — 96372 THER/PROPH/DIAG INJ SC/IM: CPT

## 2022-05-12 RX ORDER — IBUPROFEN 200 MG
400 TABLET ORAL EVERY 6 HOURS PRN
Qty: 60 TABLET | Refills: 0 | Status: SHIPPED | OUTPATIENT
Start: 2022-05-12

## 2022-05-12 RX ORDER — ACETAMINOPHEN 325 MG/1
650 TABLET ORAL EVERY 6 HOURS PRN
Qty: 60 TABLET | Refills: 0 | Status: SHIPPED | OUTPATIENT
Start: 2022-05-12

## 2022-05-12 RX ORDER — ORPHENADRINE CITRATE 30 MG/ML
60 INJECTION INTRAMUSCULAR; INTRAVENOUS ONCE
Status: COMPLETED | OUTPATIENT
Start: 2022-05-12 | End: 2022-05-12

## 2022-05-12 RX ORDER — CYCLOBENZAPRINE HCL 5 MG
5 TABLET ORAL 2 TIMES DAILY PRN
Qty: 20 TABLET | Refills: 0 | Status: SHIPPED | OUTPATIENT
Start: 2022-05-12 | End: 2022-05-22

## 2022-05-12 RX ADMIN — ORPHENADRINE CITRATE 60 MG: 30 INJECTION INTRAMUSCULAR; INTRAVENOUS at 02:34

## 2022-05-12 ASSESSMENT — PAIN SCALES - GENERAL: PAINLEVEL_OUTOF10: 7

## 2022-05-12 ASSESSMENT — ENCOUNTER SYMPTOMS
SHORTNESS OF BREATH: 0
NAUSEA: 0
VOMITING: 0
ABDOMINAL PAIN: 0
DIARRHEA: 0
COUGH: 0

## 2022-05-12 NOTE — ED PROVIDER NOTES
[facility]     Emergency Department     Faculty Attestation    I performed a history and physical examination of the patient and discussed management with the resident. I have reviewed and agree with the residents findings including all diagnostic interpretations, and treatment plans as written. Any areas of disagreement are noted on the chart. I was personally present for the key portions of any procedures. I have documented in the chart those procedures where I was not present during the key portions. I have reviewed the emergency nurses triage note. I agree with the chief complaint, past medical history, past surgical history, allergies, medications, social and family history as documented unless otherwise noted below. Documentation of the HPI, Physical Exam and Medical Decision Making performed by melanieiblouis is based on my personal performance of the HPI, PE and MDM. For Physician Assistant/ Nurse Practitioner cases/documentation I have personally evaluated this patient and have completed at least one if not all key elements of the E/M (history, physical exam, and MDM). Additional findings are as noted. 18 yo F c/o R shoulder pain / R wrist pain, no direct injury, pt feels she may slept wrong,   pe R shoulder no deformity, mild diffuse tenderness, R dorsal wrist minimal tenderness, no deformity, nv intact distal R upper extremity, compartment soft, nv intact,     xr R clavicle-  xr R shoulder -,   Will discharge    EKG Interpretation    Interpreted by me      CRITICAL CARE: There was a high probability of clinically significant/life threatening deterioration in this patient's condition which required my urgent intervention. Total critical care time was 0 minutes. This excludes any time for separately reportable procedures.        DEJONus-Lupe 24, DO  05/12/22 [address] [facility],   05/12/22 [id number]

## 2022-05-12 NOTE — ED PROVIDER NOTES
[facility]  ED  Emergency Department Encounter  EmergencyMedicine Resident     Pt Aide Painting  MRN: [id number]  [place] 2000  Date of evaluation: 5/12/22  PCP:  No primary care provider on file. This patient was evaluated in the Emergency Department for symptoms described in the history of present illness. The patient was evaluated in the context of the global COVID-19 pandemic, which necessitated consideration that the patient might be at risk for infection with the SARS-CoV-2 virus that causes COVID-19. Institutional protocols and algorithms that pertain to the evaluation of patients at risk for COVID-19 are in a state of rapid change based on information released by regulatory bodies including the CDC and federal and state organizations. These policies and algorithms were followed during the patient's care in the ED. CHIEF COMPLAINT       Chief Complaint   Patient presents with    Shoulder Pain     Pt was advised to come to ED per employer d/t c/o right shoulder pain with n/t to right hand. Pt denies injury. HISTORY OF PRESENT ILLNESS  (Location/Symptom, Timing/Onset, Context/Setting, Quality, Duration, Modifying Factors, Severity.)      Jimmy Atwood is a 19 y.o. female who presents with right clavicle pain. Patient reports that she slept on her right shoulder awkwardly last night, was at work, had progressive worsening right clavicle pain, achy, mild to moderate severity, no associated symptoms. Patient denies headache, vision changes, neck pain, weakness, numbness, chest pain, shortness of breath, palpitations, diaphoresis, abdominal pain, nausea, vomiting. Patient did not take anything for pain prior to arrival.  Patient denies any past orthopedic surgery of the right shoulder. PAST MEDICAL / SURGICAL / SOCIAL / FAMILY HISTORY      has no past medical history on file.        has a past surgical history that includes [place] tooth extraction (Bilateral). Social History     Socioeconomic History    Marital status: Single     Spouse name: Not on file    Number of children: Not on file    Years of education: Not on file    Highest education level: Not on file   Occupational History    Not on file   Tobacco Use    Smoking status: Never Smoker    Smokeless tobacco: Never Used   Substance and Sexual Activity    Alcohol use: No    Drug use: No    Sexual activity: Not on file   Other Topics Concern    Not on file   Social History Narrative    Not on file     Social Determinants of Health     Financial Resource Strain:     Difficulty of Paying Living Expenses: Not on file   Food Insecurity:     Worried About Running Out of Food in the Last Year: Not on file    Mark of Food in the Last Year: Not on file   Transportation Needs:     Lack of Transportation (Medical): Not on file    Lack of Transportation (Non-Medical):  Not on file   Physical Activity:     Days of Exercise per Week: Not on file    Minutes of Exercise per Session: Not on file   Stress:     Feeling of Stress : Not on file   Social Connections:     Frequency of Communication with Friends and Family: Not on file    Frequency of Social Gatherings with Friends and Family: Not on file    Attends Orthodox Services: Not on file    Active Member of [address] [place] [address] [employer] or Organizations: Not on file    Attends Club or Organization Meetings: Not on file    Marital Status: Not on file   Intimate Partner Violence:     Fear of Current or Ex-Partner: Not on file    Emotionally Abused: Not on file    Physically Abused: Not on file    Sexually Abused: Not on file   Housing Stability:     Unable to Pay for Housing in the Last Year: Not on file    Number of [place] in the Last Year: Not on file    Unstable Housing in the Last Year: Not on file       Family History   Problem Relation Age of Onset    Heart Disease Mother     Heart Disease Father     Heart Disease Maternal Grandmother  Heart Disease Maternal Grandfather     Cancer Maternal Grandfather         lung CA    Heart Disease Paternal Grandmother     Heart Disease Paternal Grandfather        Allergies:  Patient has no known allergies. Home Medications:  Prior to Admission medications    Medication Sig Start Date End Date Taking? Authorizing Provider   acetaminophen (TYLENOL) 325 MG tablet Take 2 tablets by mouth every 6 hours as needed for Pain 5/12/22  Yes Tiffanie Hernandez MD   ibuprofen (ADVIL;MOTRIN) 200 MG tablet Take 2 tablets by mouth every 6 hours as needed for Pain or Fever 5/12/22  Yes Tiffanie Hernandez MD   cyclobenzaprine (FLEXERIL) 5 MG tablet Take 1 tablet by mouth 2 times daily as needed for Muscle spasms 5/12/22 5/22/22 Yes Tiffanie Hernandez MD       REVIEW OF SYSTEMS    (2-9 systems for level 4, 10 or more for level 5)      Review of Systems   Constitutional: Negative for chills, diaphoresis, fatigue and fever. Eyes: Negative for visual disturbance. Respiratory: Negative for cough and shortness of breath. Cardiovascular: Negative for chest pain and palpitations. Gastrointestinal: Negative for abdominal pain, diarrhea, nausea and vomiting. Musculoskeletal: Positive for arthralgias. Negative for neck pain and neck stiffness. Skin: Negative for pallor, rash and wound. Neurological: Negative for dizziness, syncope, weakness, light-headedness and headaches. PHYSICAL EXAM   (up to 7 for level 4, 8 or more for level 5)      INITIAL VITALS:   BP (!) 170/115   Pulse 88   Temp 97.9 °F (36.6 °C) (Oral)   Resp 18   SpO2 99%     Physical Exam  Constitutional:       General: She is not in acute distress. Appearance: Normal appearance. She is well-developed. She is not ill-appearing, toxic-appearing or diaphoretic. HENT:      Head: Normocephalic and atraumatic. Right Ear: External ear normal.      Left Ear: External ear normal.   Eyes:      General:         Right eye: No discharge.          Left eye: No discharge. Extraocular Movements: Extraocular movements intact. Neck:      Vascular: No JVD. Trachea: No tracheal deviation. Cardiovascular:      Rate and Rhythm: Normal rate and regular rhythm. Pulses: Normal pulses. Heart sounds: Normal heart sounds. No murmur heard. No friction rub. No gallop. Pulmonary:      Effort: Pulmonary effort is normal. No respiratory distress. Breath sounds: Normal breath sounds. No stridor. No wheezing, rhonchi or rales. Chest:      Chest wall: No tenderness. Abdominal:      General: There is no distension. Palpations: Abdomen is soft. There is no mass. Tenderness: There is no abdominal tenderness. There is no right CVA tenderness, left CVA tenderness or guarding. Musculoskeletal:         General: Tenderness present. Normal range of motion. Cervical back: Normal range of motion and neck supple. No rigidity or tenderness. Comments: Patient has tenderness over right clavicle, no crepitus, no overlying skin changes, no right shoulder tenderness, no other joint tenderness, distal pulse intact equal bilaterally, full range of motion, sensation light touch intact, patient does have ganglion cyst on the dorsal aspect of the right wrist   Skin:     General: Skin is warm. Capillary Refill: Capillary refill takes less than 2 seconds. Neurological:      Mental Status: She is alert and oriented to person, place, and time. Sensory: No sensory deficit. Motor: No weakness.    Psychiatric:         Mood and Affect: Mood normal.         Behavior: Behavior normal.         DIFFERENTIAL  DIAGNOSIS     PLAN (LABS / IMAGING / EKG):  Orders Placed This Encounter   Procedures    XR CLAVICLE RIGHT    XR SHOULDER RIGHT (MIN 2 VIEWS)       MEDICATIONS ORDERED:  Orders Placed This Encounter   Medications    orphenadrine (NORFLEX) injection 60 mg    acetaminophen (TYLENOL) 325 MG tablet     Sig: Take 2 tablets by mouth every 6 hours as needed for Pain     Dispense:  60 tablet     Refill:  0    ibuprofen (ADVIL;MOTRIN) 200 MG tablet     Sig: Take 2 tablets by mouth every 6 hours as needed for Pain or Fever     Dispense:  60 tablet     Refill:  0    cyclobenzaprine (FLEXERIL) 5 MG tablet     Sig: Take 1 tablet by mouth 2 times daily as needed for Muscle spasms     Dispense:  20 tablet     Refill:  0       DDX:     DIAGNOSTIC RESULTS / EMERGENCY DEPARTMENT COURSE / MDM   LAB RESULTS:  No results found for this visit on 05/12/22. IMPRESSION: 75-year-old female with pain over the right clavicle, will obtain x-ray imaging. Attending had pain over the right shoulder, will obtain shoulder x-ray as well. No systemic symptoms, vital signs stable, no indication for labs or further imaging at this time. Patient denies any trauma, no other injuries. Patient is neurovascular intact. Will administer Norflex, reassess. RADIOLOGY:  XR CLAVICLE RIGHT    Result Date: 5/12/2022  EXAMINATION: TWO XRAY VIEWS OF THE RIGHT CLAVICLE; THREE XRAY VIEWS OF THE RIGHT SHOULDER 5/12/2022 3:09 am COMPARISON: Chest x-ray of 02/15/2020 HISTORY: ORDERING SYSTEM PROVIDED HISTORY: Right clavicle pain TECHNOLOGIST PROVIDED HISTORY: Right clavicle pain FINDINGS: There is no fracture of the clavicle or distortion of the acromioclavicular joint. The articular margins appear normal in the coracoclavicular space is normal. There is no fracture, collapse, or deformity of the humeral head. The glenohumeral joint is intact. There is no evidence of acute bony Bankart lesion. Body of the scapula also appears intact. There are no radiopaque foreign bodies. No acute fracture of the clavicle or dislocation of the acromioclavicular joint. No fracture or dislocation at the glenohumeral joint.      XR SHOULDER RIGHT (MIN 2 VIEWS)    Result Date: 5/12/2022  EXAMINATION: TWO XRAY VIEWS OF THE RIGHT CLAVICLE; THREE XRAY VIEWS OF THE RIGHT SHOULDER 5/12/2022 3:09 am COMPARISON: Chest x-ray of 02/15/2020 HISTORY: ORDERING SYSTEM PROVIDED HISTORY: Right clavicle pain TECHNOLOGIST PROVIDED HISTORY: Right clavicle pain FINDINGS: There is no fracture of the clavicle or distortion of the acromioclavicular joint. The articular margins appear normal in the coracoclavicular space is normal. There is no fracture, collapse, or deformity of the humeral head. The glenohumeral joint is intact. There is no evidence of acute bony Bankart lesion. Body of the scapula also appears intact. There are no radiopaque foreign bodies. No acute fracture of the clavicle or dislocation of the acromioclavicular joint. No fracture or dislocation at the glenohumeral joint. EKG      All EKG's are interpreted by the Emergency Department Physician who either signs or Co-signs this chart in the absence of a cardiologist.    EMERGENCY DEPARTMENT COURSE:  Patient came to emergency department, HPI and physical exam were conducted. All nursing notes were reviewed. X-ray negative for any acute abnormality. Patient remained stable in the emergency room with stable vital signs. Gave strict return precautions to the emergency department and discharge patient home. Recommended patient follow-up with PCP in the next few days for reevaluation. No notes of  Admission Criteria type on file. PROCEDURES:      CONSULTS:  None    CRITICAL CARE:      FINAL IMPRESSION      1.  Acute pain of right shoulder          DISPOSITION / PLAN     DISPOSITION        PATIENT REFERRED TO:  [facility]  [address] [facility] [address] [phone]  Go to   As needed, If symptoms worsen    [address] [address] [phone]  Schedule an appointment as soon as possible for a visit in 3 days  For reassessment, For establishment of care      DISCHARGE MEDICATIONS:  Discharge Medication List as of 5/12/2022  3:25 AM      START taking these medications    Details acetaminophen (TYLENOL) 325 MG tablet Take 2 tablets by mouth every 6 hours as needed for Pain, Disp-60 tablet, R-0Print      ibuprofen (ADVIL;MOTRIN) 200 MG tablet Take 2 tablets by mouth every 6 hours as needed for Pain or Fever, Disp-60 tablet, R-0Print      cyclobenzaprine (FLEXERIL) 5 MG tablet Take 1 tablet by mouth 2 times daily as needed for Muscle spasms, Disp-20 tablet, R-0Print             Candy To MD  Emergency Medicine Resident    (Please note that portions of thisnote were completed with a voice recognition program.  Efforts were made to edit the dictations but occasionally words are mis-transcribed.)        Candy To MD  Resident  05/12/22 [id number]

## 2022-05-25 ENCOUNTER — HOSPITAL ENCOUNTER (EMERGENCY)
Age: 22
Discharge: HOME | End: 2022-05-25
Attending: EMERGENCY MEDICINE
Payer: COMMERCIAL

## 2022-05-25 ENCOUNTER — APPOINTMENT (OUTPATIENT)
Dept: GENERAL RADIOLOGY | Age: 22
End: 2022-05-25
Payer: COMMERCIAL

## 2022-05-25 VITALS
DIASTOLIC BLOOD PRESSURE: 97 MMHG | BODY MASS INDEX: 30.73 KG/M2 | WEIGHT: 180 LBS | RESPIRATION RATE: 16 BRPM | SYSTOLIC BLOOD PRESSURE: 138 MMHG | HEIGHT: 64 IN | OXYGEN SATURATION: 98 % | HEART RATE: 71 BPM | TEMPERATURE: 98.6 F

## 2022-05-25 DIAGNOSIS — M25.462 KNEE EFFUSION, LEFT: Primary | ICD-10-CM

## 2022-05-25 PROCEDURE — 73562 X-RAY EXAM OF KNEE 3: CPT

## 2022-05-25 PROCEDURE — 6370000000 HC RX 637 (ALT 250 FOR IP): Performed by: EMERGENCY MEDICINE

## 2022-05-25 PROCEDURE — 99283 EMERGENCY DEPT VISIT LOW MDM: CPT

## 2022-05-25 RX ORDER — IBUPROFEN 600 MG/1
600 TABLET ORAL ONCE
Status: COMPLETED | OUTPATIENT
Start: 2022-05-25 | End: 2022-05-25

## 2022-05-25 RX ADMIN — IBUPROFEN 600 MG: 600 TABLET ORAL at 22:25

## 2022-05-25 ASSESSMENT — PAIN DESCRIPTION - ORIENTATION
ORIENTATION: LEFT
ORIENTATION: LEFT

## 2022-05-25 ASSESSMENT — PAIN DESCRIPTION - PAIN TYPE: TYPE: ACUTE PAIN

## 2022-05-25 ASSESSMENT — PAIN DESCRIPTION - DESCRIPTORS
DESCRIPTORS: THROBBING
DESCRIPTORS: THROBBING

## 2022-05-25 ASSESSMENT — PAIN DESCRIPTION - FREQUENCY: FREQUENCY: CONTINUOUS

## 2022-05-25 ASSESSMENT — PAIN DESCRIPTION - LOCATION
LOCATION: KNEE
LOCATION: KNEE

## 2022-05-25 ASSESSMENT — PAIN SCALES - GENERAL
PAINLEVEL_OUTOF10: 5
PAINLEVEL_OUTOF10: 7
PAINLEVEL_OUTOF10: 9

## 2022-05-25 NOTE — Clinical Note
Josiah Islas was seen and treated in our emergency department on 5/25/2022. She may return to work on 05/28/2022. If you have any questions or concerns, please don't hesitate to call.       Valentin Ferrell, DO
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
## 2022-05-26 NOTE — ED TRIAGE NOTES
Pt arrives dt complaint of left knee pain that has been ongoing.  Pt states she occasionally hears it pop however today when getting out of bed she heard it pop and had extreme pain post.

## 2022-05-27 ENCOUNTER — OFFICE VISIT (OUTPATIENT)
Dept: ORTHOPEDIC SURGERY | Age: 22
End: 2022-05-27
Payer: COMMERCIAL

## 2022-05-27 VITALS — RESPIRATION RATE: 20 BRPM | HEIGHT: 64 IN | BODY MASS INDEX: 30.73 KG/M2 | WEIGHT: 180 LBS

## 2022-05-27 DIAGNOSIS — M25.562 ACUTE PAIN OF LEFT KNEE: Primary | ICD-10-CM

## 2022-05-27 DIAGNOSIS — M76.52 PATELLAR TENDINITIS OF LEFT KNEE: ICD-10-CM

## 2022-05-27 PROCEDURE — 99204 OFFICE O/P NEW MOD 45 MIN: CPT | Performed by: PHYSICIAN ASSISTANT

## 2022-05-27 RX ORDER — MELOXICAM 15 MG/1
15 TABLET ORAL DAILY
Qty: 30 TABLET | Refills: 3 | Status: SHIPPED | OUTPATIENT
Start: 2022-05-27

## 2022-05-27 NOTE — PROGRESS NOTES
[address] [facility], [address] [place] [address], [address] [place] [address], [id number] [place] [address]           Dept Phone: [phone]           Dept Fax:  [phone] [facility]           Jayne Kam          Dept Phone: [phone]           Dept Fax:  [phone]      Chief Compliant:  Chief Complaint   Patient presents with   Trina Burgos Doctor    Pain     left knee        History of Present Illness: This is a 19 y.o. female who presents to the clinic today for evaluation of had concerns including Established New Doctor and Pain (left knee). Ms. Kiel Babin is a 49-year-old female presents for evaluation of 2-day history of left knee pain. Patient reports chronic \"popping\" each morning when she wakes up however she had an incident on Wednesday, 5/25/2022 where this happened and it was much more significant and had a sudden onset of pain thereafter. Patient had difficulty bearing weight thereafter. She was seen in the ED on 5/25/2022 where she had x-rays that were negative for any acute fracture or other acute abnormality. Patient was fitted for a knee brace and instructed to follow-up with us for further evaluation. Patient reports her pain has improved mildly but she continues to have severe pain localized to anterior knee. Pain is aggravated by any weightbearing in terminal flexion or extension. She does find comfort and holding the knee in a fixed position.   She denies any history of surgery on this knee no history of physical therapy or problems in the past.       Past History:    Current Outpatient Medications:     meloxicam (MOBIC) 15 MG tablet, Take 1 tablet by mouth daily, Disp: 30 tablet, Rfl: 3    acetaminophen (TYLENOL) 325 MG tablet, Take 2 tablets by mouth every 6 hours as needed for Pain, Disp: 60 tablet, Rfl: 0    ibuprofen (ADVIL;MOTRIN) 200 MG tablet, Take 2 tablets by mouth every 6 hours as needed for Pain or Fever, Disp: 60 tablet, Rfl: 0  No Known Allergies  Social History     Socioeconomic History    Marital status: Single     Spouse name: Not on file    Number of children: Not on file    Years of education: Not on file    Highest education level: Not on file   Occupational History    Not on file   Tobacco Use    Smoking status: Never Smoker    Smokeless tobacco: Never Used   Substance and Sexual Activity    Alcohol use: No     Comment: rare    Drug use: No    Sexual activity: Not on file   Other Topics Concern    Not on file   Social History Narrative    Not on file     Social Determinants of Health     Financial Resource Strain:     Difficulty of Paying Living Expenses: Not on file   Food Insecurity:     Worried About Running Out of Food in the Last Year: Not on file    Mark of Food in the Last Year: Not on file   Transportation Needs:     Lack of Transportation (Medical): Not on file    Lack of Transportation (Non-Medical):  Not on file   Physical Activity:     Days of Exercise per Week: Not on file    Minutes of Exercise per Session: Not on file   Stress:     Feeling of Stress : Not on file   Social Connections:     Frequency of Communication with Friends and Family: Not on file    Frequency of Social Gatherings with Friends and Family: Not on file    Attends Scientologist Services: Not on file    Active Member of [address] [place] [address] or Organizations: Not on file    Attends Club or Organization Meetings: Not on file    Marital Status: Not on file   Intimate Partner Violence:     Fear of Current or Ex-Partner: Not on file    Emotionally Abused: Not on file    Physically Abused: Not on file    Sexually Abused: Not on file   Housing Stability:     Unable to Pay for Housing in the Last Year: Not on file    Number of [place] in the Last Year: Not on file    Unstable Housing in the Last Year: Not on file     No past medical history on file. Past Surgical History:   Procedure Laterality Date    WISDOM TOOTH EXTRACTION Bilateral     all 4 removed at 14years old     Family History   Problem Relation Age of Onset    Heart Disease Mother     Heart Disease Father     Heart Disease Maternal Grandmother     Heart Disease Maternal Grandfather     Cancer Maternal Grandfather         lung CA    Heart Disease Paternal Grandmother     Heart Disease Paternal Grandfather         Review of Systems   Constitutional: Negative for fever, chills, sweats. Eyes: Negative for changes in vision, or pain. HENT: Negative for ear ache, epistaxis, or sore throat. Respiratory/Cardio: Negative for Chest pain, palpitations, SOB, or cough. Gastrointestinal: Negative for abdominal pain, N/V/D. Genitourinary: Negative for dysuria, frequency, urgency, or hematuria. Neurological: Negative for headache, numbness, or weakness. Integumentary: Negative for rash, itching, laceration, or abrasion. Musculoskeletal: Positive for Established New Doctor and Pain (left knee)       Physical Exam:  Constitutional: Patient is oriented to person, place, and time. Patient appears well-developed and well nourished. HENT: Negative otherwise noted  Head: Normocephalic and Atraumatic  Nose: Normal  Eyes: Conjunctivae and EOM are normal  Neck: Normal range of motion Neck supple. Respiratory/Cardio: Effort normal. No respiratory distress. Musculoskeletal:    left Knee:     Skin: warm and dry, no rash or erythema  Vasculature: 2+ pedal pulses bilaterally  Neuro: Sensation grossly intact to light touch diffusely  Alignment: Normal  Tenderness: Moderate tenderness to the inferior pole of the patella and the patellar tendon. No tenderness to the medial or lateral joint line. No tenderness to quad tendon, pes anserine bursa or posterior knee.   Effusion: Small effusion present    ROM: (Degrees)       A P       Extension  0 0       Flexion   120 125       Crepitation  Yes       Muscle strength:         Flexion   5      Extension  5      SLR   5        Extensor lag   n          Special testing:  y    Pain with deep knee flexion     n    Patellar grind       y    Patellar apprehension      y    Patellar glide         n    Lachman       n    Anterior drawer      n    Pivot shift       n    Posterior drawer      n    Dial test       n    Posterolateral drawer      n    Posterior Sag       n    MCL        n    LCL          n    Medial joint line tenderness     n    Lateral joint line tenderness     n    McMurrey's         Neurological: Patient is alert and oriented to person, place, and time. Normal strenght. No sensory deficit. Skin: Skin is warm and dry  Psychiatric: Behavior is normal. Thought content normal.  Nursing note and vitals reviewed. Labs and Imaging:     XR KNEE LEFT (3 VIEWS)  Narrative: EXAMINATION:  THREE XRAY VIEWS OF THE LEFT KNEE    5/25/2022 7:34 pm    COMPARISON:  None. HISTORY:  ORDERING SYSTEM PROVIDED HISTORY: pain to left anterior knee, felt a \"pop\"  TECHNOLOGIST PROVIDED HISTORY:  pain to left anterior knee, felt a \"pop\"  Reason for Exam: pain to left anterior knee, felt a \"pop    FINDINGS:  No acute fracture. Alignment is anatomic. Joint spaces are preserved. Bony  mineralization is normal.  No effusion or foreign body. Impression: No acute radiographic abnormality of the left knee. No new x-rays taken today however 3 views of the left knee available for review from 5/25/2022  Normal anatomic alignment. Joint spaces are overall preserved with no evidence of acute fracture, subluxation or dislocation.        Orders Placed This Encounter   Procedures   [facility]     Referral Priority:   Routine     Referral Type:   Eval and Treat     Referral Reason:   Specialty Services Required     Requested Specialty:   Physical Therapist Number of Visits Requested:   1       Assessment and Plan:  1. Acute pain of left knee          PLAN:  Nhan Buchanan is a 19 y.o. old female who presents to the clinic today for evaluation of left knee pain concerning for patellofemoral pain syndrome and patellar tendonitis. Considered in the differential are collateral/cruciate ligament sprain versus tear, meniscal injury, septic joint and fracture. There is no evidence of fracture or degenerative changes on radiographs today. No evidence of ligamentous laxity or meniscal pathology on exam.  Pain is most consistent with patellofemoral pain syndrome. 1. We discussed treatment options available to her, including nonoperative and operative intervention. 2. At this time I believe she will benefit from conservative management. 3. Consequently, a prescription for therapy was provided and a prescription for meloxicam 15 mg daily to be with meals over the course of the next 2 weeks was electronically sent to her pharmacy. 4. I'll see her back my clinic in 6 weeks for reevaluation but she was encouraged to return or call earlier with questions and/or concerns. Electronically signed by ANAMARIA Faith on 5/27/22 at 11:22 AM EDT        Please note that this chart was generated using voice recognition Dragon dictation software. Although every effort was made to ensure the accuracy of this automated transcription, some errors in transcription may have occurred.

## 2022-05-27 NOTE — LETTER
[facility]  [address] [facility] [address]  Phone: [phone]  Fax: [phone]    Lion Stephens        May 27, 2022     Patient: Cornel Tijerina   YOB: 2000   Date of Visit: 5/27/2022       To Whom It May Concern: It is my medical opinion that [place] should be excused from work on Sunday 5/29 and Monday 5/30. If you have any questions or concerns, please don't hesitate to call.     Sincerely,      Electronically signed by Vipul Smith on 5/27/2022 at 11:21 AM      Lion Stephens